# Patient Record
Sex: MALE | Race: WHITE | Employment: OTHER | URBAN - METROPOLITAN AREA
[De-identification: names, ages, dates, MRNs, and addresses within clinical notes are randomized per-mention and may not be internally consistent; named-entity substitution may affect disease eponyms.]

---

## 2017-08-28 PROBLEM — S68.110A AMPUTATION OF RIGHT INDEX FINGER: Status: ACTIVE | Noted: 2017-08-28

## 2019-08-05 ENCOUNTER — TELEPHONE (OUTPATIENT)
Dept: INFECTIOUS DISEASES | Age: 84
End: 2019-08-05

## 2019-08-14 ENCOUNTER — OFFICE VISIT (OUTPATIENT)
Dept: INFECTIOUS DISEASES | Age: 84
End: 2019-08-14
Payer: MEDICARE

## 2019-08-14 VITALS
DIASTOLIC BLOOD PRESSURE: 68 MMHG | WEIGHT: 167 LBS | BODY MASS INDEX: 26.21 KG/M2 | SYSTOLIC BLOOD PRESSURE: 115 MMHG | HEIGHT: 67 IN | HEART RATE: 67 BPM

## 2019-08-14 DIAGNOSIS — A41.9 SEPTICEMIA (HCC): Primary | ICD-10-CM

## 2019-08-14 PROCEDURE — G8427 DOCREV CUR MEDS BY ELIG CLIN: HCPCS | Performed by: INTERNAL MEDICINE

## 2019-08-14 PROCEDURE — 1036F TOBACCO NON-USER: CPT | Performed by: INTERNAL MEDICINE

## 2019-08-14 PROCEDURE — 4040F PNEUMOC VAC/ADMIN/RCVD: CPT | Performed by: INTERNAL MEDICINE

## 2019-08-14 PROCEDURE — 99213 OFFICE O/P EST LOW 20 MIN: CPT | Performed by: INTERNAL MEDICINE

## 2019-08-14 PROCEDURE — G8419 CALC BMI OUT NRM PARAM NOF/U: HCPCS | Performed by: INTERNAL MEDICINE

## 2019-08-14 PROCEDURE — 1123F ACP DISCUSS/DSCN MKR DOCD: CPT | Performed by: INTERNAL MEDICINE

## 2019-08-14 RX ORDER — AMLODIPINE BESYLATE 5 MG/1
5 TABLET ORAL DAILY
COMMUNITY

## 2019-08-14 RX ORDER — ACETAMINOPHEN 325 MG/1
650 TABLET ORAL EVERY 6 HOURS PRN
COMMUNITY

## 2019-08-14 RX ORDER — SIMVASTATIN 10 MG
10 TABLET ORAL NIGHTLY
COMMUNITY

## 2019-08-14 RX ORDER — BRIMONIDINE TARTRATE, TIMOLOL MALEATE 2; 5 MG/ML; MG/ML
1 SOLUTION/ DROPS OPHTHALMIC EVERY 12 HOURS
COMMUNITY

## 2019-08-14 RX ORDER — LISINOPRIL 20 MG/1
20 TABLET ORAL DAILY
COMMUNITY

## 2019-08-15 NOTE — PROGRESS NOTES
Emotionally abused: None     Physically abused: None     Forced sexual activity: None   Other Topics Concern    None   Social History Narrative    None     History reviewed. No pertinent family history.     Current med     Current Outpatient Medications:     simvastatin (ZOCOR) 10 MG tablet, Take 10 mg by mouth nightly, Disp: , Rfl:     brimonidine-timolol (COMBIGAN) 0.2-0.5 % ophthalmic solution, Place 1 drop into both eyes every 12 hours, Disp: , Rfl:     amLODIPine (NORVASC) 5 MG tablet, Take 5 mg by mouth daily, Disp: , Rfl:     lisinopril (PRINIVIL;ZESTRIL) 20 MG tablet, Take 20 mg by mouth daily, Disp: , Rfl:     acetaminophen (TYLENOL) 325 MG tablet, Take 650 mg by mouth every 6 hours as needed for Pain, Disp: , Rfl:     aspirin 325 MG tablet, Take 325 mg by mouth daily, Disp: , Rfl:      Review of Systems:  CONSTITUTIONAL:  negative  EYES:  negative  HEENT:  negative  RESPIRATORY:  negative  CARDIOVASCULAR:  negative  GASTROINTESTINAL:  negative  GENITOURINARY:  negative  INTEGUMENT/BREAST:  negative  HEMATOLOGIC/LYMPHATIC:  negative  ALLERGIC/IMMUNOLOGIC:  negative  ENDOCRINE:  negative  MUSCULOSKELETAL:  negative  NEUROLOGICAL:  negative  BEHAVIOR/PSYCH:  negative    /68 (Site: Right Lower Arm)   Pulse 67   Ht 5' 7\" (1.702 m)   Wt 167 lb (75.8 kg)   BMI 26.16 kg/m²       EXAM:  CONSTITUTIONAL:  awake, alert, cooperative, no apparent distress,  EYES: conjunctiva normal  ENT:  Normocephalic, without obvious abnormality, atraumatic,  LUNGS:  No increased work of breathing, good air exchange, clear to auscultation bilaterally, no crackles or wheezing  CARDIOVASCULAR:  regular rate and rhythm, normal S1 and S2,  no murmur noted  ABDOMEN:   normal bowel sounds, soft, non-distended, non-tender, no masses palpated, no hepatosplenomegally,   MUSCULOSKELETAL:  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

## 2019-08-23 ENCOUNTER — TELEPHONE (OUTPATIENT)
Dept: INFECTIOUS DISEASES | Age: 84
End: 2019-08-23

## 2019-08-27 ENCOUNTER — TELEPHONE (OUTPATIENT)
Dept: INFECTIOUS DISEASES | Age: 84
End: 2019-08-27

## 2019-08-27 PROBLEM — C44.92 SQUAMOUS CELL CARCINOMA OF SKIN: Status: ACTIVE | Noted: 2019-08-27

## 2019-08-27 PROBLEM — K58.9 IRRITABLE BOWEL SYNDROME: Status: ACTIVE | Noted: 2019-08-27

## 2019-08-27 PROBLEM — R26.89 IMPAIRMENT OF BALANCE: Status: ACTIVE | Noted: 2019-08-27

## 2019-08-27 PROBLEM — H91.90 HEARING LOSS: Status: ACTIVE | Noted: 2019-08-27

## 2019-08-27 PROBLEM — I10 ESSENTIAL HYPERTENSION: Status: ACTIVE | Noted: 2019-08-27

## 2019-08-27 PROBLEM — E78.5 DYSLIPIDEMIA: Status: ACTIVE | Noted: 2019-08-27

## 2019-08-27 PROBLEM — N18.2 STAGE 2 CHRONIC KIDNEY DISEASE: Status: ACTIVE | Noted: 2017-02-09

## 2019-08-27 PROBLEM — F32.A DEPRESSIVE DISORDER: Status: ACTIVE | Noted: 2019-08-27

## 2019-08-27 PROBLEM — E11.9 TYPE 2 DIABETES MELLITUS (HCC): Status: ACTIVE | Noted: 2019-08-27

## 2019-08-27 PROBLEM — H40.9 GLAUCOMA: Status: ACTIVE | Noted: 2019-08-27

## 2019-08-27 PROBLEM — G56.00 CARPAL TUNNEL SYNDROME: Status: ACTIVE | Noted: 2019-08-27

## 2019-08-27 PROBLEM — M19.90 OSTEOARTHROSIS: Status: ACTIVE | Noted: 2019-08-27

## 2019-08-27 PROBLEM — E55.9 VITAMIN D DEFICIENCY: Status: ACTIVE | Noted: 2017-04-24

## 2019-08-27 PROBLEM — N32.81 OVERACTIVE BLADDER: Status: ACTIVE | Noted: 2019-08-27

## 2019-08-27 PROBLEM — M85.80 OSTEOPENIA: Status: ACTIVE | Noted: 2019-08-27

## 2019-08-27 PROBLEM — N43.3 HYDROCELE OF TESTIS: Status: ACTIVE | Noted: 2019-08-27

## 2019-08-27 PROBLEM — D64.9 ANEMIA: Status: ACTIVE | Noted: 2019-08-27

## 2019-08-27 RX ORDER — TAMSULOSIN HYDROCHLORIDE 0.4 MG/1
0.4 CAPSULE ORAL NIGHTLY
COMMUNITY

## 2019-08-28 ENCOUNTER — OFFICE VISIT (OUTPATIENT)
Dept: INFECTIOUS DISEASES | Age: 84
End: 2019-08-28
Payer: MEDICARE

## 2019-08-28 VITALS
WEIGHT: 170 LBS | HEIGHT: 67 IN | HEART RATE: 63 BPM | OXYGEN SATURATION: 98 % | TEMPERATURE: 96.9 F | DIASTOLIC BLOOD PRESSURE: 64 MMHG | SYSTOLIC BLOOD PRESSURE: 177 MMHG | BODY MASS INDEX: 26.68 KG/M2

## 2019-08-28 DIAGNOSIS — A41.9 SEPTICEMIA (HCC): Primary | ICD-10-CM

## 2019-08-28 PROCEDURE — 4040F PNEUMOC VAC/ADMIN/RCVD: CPT | Performed by: INTERNAL MEDICINE

## 2019-08-28 PROCEDURE — 1036F TOBACCO NON-USER: CPT | Performed by: INTERNAL MEDICINE

## 2019-08-28 PROCEDURE — G8427 DOCREV CUR MEDS BY ELIG CLIN: HCPCS | Performed by: INTERNAL MEDICINE

## 2019-08-28 PROCEDURE — 1123F ACP DISCUSS/DSCN MKR DOCD: CPT | Performed by: INTERNAL MEDICINE

## 2019-08-28 PROCEDURE — 99213 OFFICE O/P EST LOW 20 MIN: CPT | Performed by: INTERNAL MEDICINE

## 2019-08-28 PROCEDURE — G8419 CALC BMI OUT NRM PARAM NOF/U: HCPCS | Performed by: INTERNAL MEDICINE

## 2019-09-10 DIAGNOSIS — A41.9 SEPTICEMIA (HCC): ICD-10-CM
